# Patient Record
Sex: MALE | Race: ASIAN | NOT HISPANIC OR LATINO | URBAN - METROPOLITAN AREA
[De-identification: names, ages, dates, MRNs, and addresses within clinical notes are randomized per-mention and may not be internally consistent; named-entity substitution may affect disease eponyms.]

---

## 2022-11-13 ENCOUNTER — EMERGENCY (EMERGENCY)
Facility: HOSPITAL | Age: 47
LOS: 1 days | Discharge: HOME | End: 2022-11-13
Admitting: EMERGENCY MEDICINE

## 2022-11-13 VITALS
SYSTOLIC BLOOD PRESSURE: 154 MMHG | WEIGHT: 141.1 LBS | OXYGEN SATURATION: 99 % | RESPIRATION RATE: 15 BRPM | TEMPERATURE: 98 F | HEIGHT: 68 IN | HEART RATE: 78 BPM | DIASTOLIC BLOOD PRESSURE: 104 MMHG

## 2022-11-13 VITALS
RESPIRATION RATE: 16 BRPM | HEART RATE: 67 BPM | DIASTOLIC BLOOD PRESSURE: 78 MMHG | SYSTOLIC BLOOD PRESSURE: 128 MMHG | OXYGEN SATURATION: 98 %

## 2022-11-13 LAB
ALBUMIN SERPL ELPH-MCNC: 4 G/DL — SIGNIFICANT CHANGE UP (ref 3.4–5)
ALP SERPL-CCNC: 72 U/L — SIGNIFICANT CHANGE UP (ref 40–120)
ALT FLD-CCNC: 27 U/L — SIGNIFICANT CHANGE UP (ref 12–42)
ANION GAP SERPL CALC-SCNC: 4 MMOL/L — LOW (ref 9–16)
APPEARANCE UR: CLEAR — SIGNIFICANT CHANGE UP
AST SERPL-CCNC: 18 U/L — SIGNIFICANT CHANGE UP (ref 15–37)
BASOPHILS # BLD AUTO: 0.03 K/UL — SIGNIFICANT CHANGE UP (ref 0–0.2)
BASOPHILS NFR BLD AUTO: 0.5 % — SIGNIFICANT CHANGE UP (ref 0–2)
BILIRUB SERPL-MCNC: 0.4 MG/DL — SIGNIFICANT CHANGE UP (ref 0.2–1.2)
BILIRUB UR-MCNC: NEGATIVE — SIGNIFICANT CHANGE UP
BUN SERPL-MCNC: 21 MG/DL — SIGNIFICANT CHANGE UP (ref 7–23)
CALCIUM SERPL-MCNC: 9.5 MG/DL — SIGNIFICANT CHANGE UP (ref 8.5–10.5)
CHLORIDE SERPL-SCNC: 104 MMOL/L — SIGNIFICANT CHANGE UP (ref 96–108)
CO2 SERPL-SCNC: 32 MMOL/L — HIGH (ref 22–31)
COLOR SPEC: YELLOW — SIGNIFICANT CHANGE UP
CREAT SERPL-MCNC: 1.25 MG/DL — SIGNIFICANT CHANGE UP (ref 0.5–1.3)
DIFF PNL FLD: NEGATIVE — SIGNIFICANT CHANGE UP
EGFR: 71 ML/MIN/1.73M2 — SIGNIFICANT CHANGE UP
EOSINOPHIL # BLD AUTO: 0.01 K/UL — SIGNIFICANT CHANGE UP (ref 0–0.5)
EOSINOPHIL NFR BLD AUTO: 0.2 % — SIGNIFICANT CHANGE UP (ref 0–6)
GLUCOSE SERPL-MCNC: 155 MG/DL — HIGH (ref 70–99)
GLUCOSE UR QL: NEGATIVE — SIGNIFICANT CHANGE UP
HCT VFR BLD CALC: 44.5 % — SIGNIFICANT CHANGE UP (ref 39–50)
HGB BLD-MCNC: 14.9 G/DL — SIGNIFICANT CHANGE UP (ref 13–17)
IMM GRANULOCYTES NFR BLD AUTO: 0 % — SIGNIFICANT CHANGE UP (ref 0–0.9)
KETONES UR-MCNC: NEGATIVE — SIGNIFICANT CHANGE UP
LEUKOCYTE ESTERASE UR-ACNC: NEGATIVE — SIGNIFICANT CHANGE UP
LYMPHOCYTES # BLD AUTO: 0.96 K/UL — LOW (ref 1–3.3)
LYMPHOCYTES # BLD AUTO: 17.1 % — SIGNIFICANT CHANGE UP (ref 13–44)
MCHC RBC-ENTMCNC: 31.9 PG — SIGNIFICANT CHANGE UP (ref 27–34)
MCHC RBC-ENTMCNC: 33.5 GM/DL — SIGNIFICANT CHANGE UP (ref 32–36)
MCV RBC AUTO: 95.3 FL — SIGNIFICANT CHANGE UP (ref 80–100)
MONOCYTES # BLD AUTO: 0.32 K/UL — SIGNIFICANT CHANGE UP (ref 0–0.9)
MONOCYTES NFR BLD AUTO: 5.7 % — SIGNIFICANT CHANGE UP (ref 2–14)
NEUTROPHILS # BLD AUTO: 4.31 K/UL — SIGNIFICANT CHANGE UP (ref 1.8–7.4)
NEUTROPHILS NFR BLD AUTO: 76.5 % — SIGNIFICANT CHANGE UP (ref 43–77)
NITRITE UR-MCNC: NEGATIVE — SIGNIFICANT CHANGE UP
NRBC # BLD: 0 /100 WBCS — SIGNIFICANT CHANGE UP (ref 0–0)
PH UR: 7 — SIGNIFICANT CHANGE UP (ref 5–8)
PLATELET # BLD AUTO: 188 K/UL — SIGNIFICANT CHANGE UP (ref 150–400)
POTASSIUM SERPL-MCNC: 4.1 MMOL/L — SIGNIFICANT CHANGE UP (ref 3.5–5.3)
POTASSIUM SERPL-SCNC: 4.1 MMOL/L — SIGNIFICANT CHANGE UP (ref 3.5–5.3)
PROT SERPL-MCNC: 7.4 G/DL — SIGNIFICANT CHANGE UP (ref 6.4–8.2)
PROT UR-MCNC: NEGATIVE MG/DL — SIGNIFICANT CHANGE UP
RBC # BLD: 4.67 M/UL — SIGNIFICANT CHANGE UP (ref 4.2–5.8)
RBC # FLD: 12.1 % — SIGNIFICANT CHANGE UP (ref 10.3–14.5)
SODIUM SERPL-SCNC: 140 MMOL/L — SIGNIFICANT CHANGE UP (ref 132–145)
SP GR SPEC: 1.01 — SIGNIFICANT CHANGE UP (ref 1–1.03)
TROPONIN I, HIGH SENSITIVITY RESULT: 5.3 NG/L — SIGNIFICANT CHANGE UP
UROBILINOGEN FLD QL: 0.2 E.U./DL — SIGNIFICANT CHANGE UP
WBC # BLD: 5.63 K/UL — SIGNIFICANT CHANGE UP (ref 3.8–10.5)
WBC # FLD AUTO: 5.63 K/UL — SIGNIFICANT CHANGE UP (ref 3.8–10.5)

## 2022-11-13 PROCEDURE — 70450 CT HEAD/BRAIN W/O DYE: CPT | Mod: 26

## 2022-11-13 PROCEDURE — 99284 EMERGENCY DEPT VISIT MOD MDM: CPT

## 2022-11-13 NOTE — ED ADULT NURSE NOTE - OBJECTIVE STATEMENT
PT aox3 with steady gait. Pt states "vertigo"/ room spinning around him on Wednesday. Pt states on and off dizziness and lightheadedness since then. Pt states tingling in his left arm x 2 days. No weakness noted. Pt states back pain today with night sweats

## 2022-11-13 NOTE — ED ADULT NURSE NOTE - CHIEF COMPLAINT QUOTE
patient walk in c/o vertigo and left arm numbness and tingling since Wednesday after traveling back from [place] via train; also c/o lightheadedness; residual nerve damage on left side of face/neck from getting hit by softball in 2016

## 2022-11-13 NOTE — ED PROVIDER NOTE - NSFOLLOWUPINSTRUCTIONS_ED_ALL_ED_FT
Dizziness    Dizziness can manifest as a feeling of unsteadiness or light-headedness. You may feel like you are about to faint. This condition can be caused by a number of things, including medicines, dehydration, or illness. Drink enough fluid to keep your urine clear or pale yellow. Do not drink alcohol and limit your caffeine intake. Avoid quick or sudden movements.  Rise slowly from chairs and steady yourself until you feel okay. In the morning, first sit up on the side of the bed.    SEEK IMMEDIATE MEDICAL CARE IF YOU HAVE ANY OF THE FOLLOWING SYMPTOMS: vomiting, changes in your vision or speech, weakness in your arms or legs, trouble speaking or swallowing, chest pain, abdominal pain, shortness of breath, sweating, bleeding, headache, neck pain, or fever.    take dramamine no drowsy for  dizziness

## 2022-11-13 NOTE — ED PROVIDER NOTE - OBJECTIVE STATEMENT
46 y/o Male with PMHx of nasal polyp on the left side and facial trauma s/p getting hit in the face with a baseball in 2016 and sustaining a left orbital fracture, and asymmetrical pupils presenting with dizziness that he has been experiencing since tuesday. Patient states that he was in [place] on tuesday when he began to feel like the room was spinning with associated ringing in the ears and left upper arm numbness and tingling. Patient states that it was the most severe on tuesday and has since improved. Patient states that on occasion he notices that he gets a wave of dizziness. Patient endorses congestion for the last week. Denies fever/chills and N/V.

## 2022-11-13 NOTE — ED ADULT TRIAGE NOTE - CHIEF COMPLAINT QUOTE
patient walk in c/o vertigo and left arm numbness and tingling since Wednesday after traveling back from [place] via train; also c/o lightheadedness; residual nerve damage on left side of face/neck from getting hit by softball in 2016

## 2022-11-13 NOTE — ED PROVIDER NOTE - PATIENT PORTAL LINK FT
You can access the FollowMyHealth Patient Portal offered by [facility] by registering at the following website: [email or web address]. By joining [employer]’s FollowMyHealth portal, you will also be able to view your health information using other applications (apps) compatible with our system.

## 2022-11-13 NOTE — ED PROVIDER NOTE - CLINICAL SUMMARY MEDICAL DECISION MAKING FREE TEXT BOX
Patient with new onset of vertigo symptoms since Tuesday and episode of left upper arm numbness. Will do CT scan, check labs, and continue to monitor.

## 2022-11-13 NOTE — ED ADULT NURSE NOTE - BREATHING, MLM
What Type Of Note Output Would You Prefer (Optional)?: Bullet Format Hpi Title: Evaluation of Skin Lesions Spontaneous, unlabored and symmetrical Additional History: Several rough spots of the face and both arms x’s 2-3 months.

## 2022-11-15 DIAGNOSIS — R09.81 NASAL CONGESTION: ICD-10-CM

## 2022-11-15 DIAGNOSIS — R20.2 PARESTHESIA OF SKIN: ICD-10-CM

## 2022-11-15 DIAGNOSIS — R20.0 ANESTHESIA OF SKIN: ICD-10-CM

## 2022-11-15 DIAGNOSIS — R42 DIZZINESS AND GIDDINESS: ICD-10-CM

## 2022-11-15 DIAGNOSIS — Z88.1 ALLERGY STATUS TO OTHER ANTIBIOTIC AGENTS STATUS: ICD-10-CM

## 2022-12-20 ENCOUNTER — EMERGENCY (EMERGENCY)
Facility: HOSPITAL | Age: 47
LOS: 1 days | Discharge: HOME | End: 2022-12-20
Attending: EMERGENCY MEDICINE | Admitting: EMERGENCY MEDICINE

## 2022-12-20 VITALS
HEIGHT: 68 IN | SYSTOLIC BLOOD PRESSURE: 136 MMHG | RESPIRATION RATE: 16 BRPM | OXYGEN SATURATION: 99 % | DIASTOLIC BLOOD PRESSURE: 87 MMHG | TEMPERATURE: 98 F | WEIGHT: 139.99 LBS | HEART RATE: 74 BPM

## 2022-12-20 VITALS
TEMPERATURE: 98 F | OXYGEN SATURATION: 98 % | DIASTOLIC BLOOD PRESSURE: 84 MMHG | RESPIRATION RATE: 17 BRPM | SYSTOLIC BLOOD PRESSURE: 132 MMHG | HEART RATE: 76 BPM

## 2022-12-20 DIAGNOSIS — Z20.822 CONTACT WITH AND (SUSPECTED) EXPOSURE TO COVID-19: ICD-10-CM

## 2022-12-20 DIAGNOSIS — R10.9 UNSPECIFIED ABDOMINAL PAIN: ICD-10-CM

## 2022-12-20 DIAGNOSIS — Z88.1 ALLERGY STATUS TO OTHER ANTIBIOTIC AGENTS STATUS: ICD-10-CM

## 2022-12-20 DIAGNOSIS — Z87.09 PERSONAL HISTORY OF OTHER DISEASES OF THE RESPIRATORY SYSTEM: ICD-10-CM

## 2022-12-20 DIAGNOSIS — R06.02 SHORTNESS OF BREATH: ICD-10-CM

## 2022-12-20 DIAGNOSIS — R42 DIZZINESS AND GIDDINESS: ICD-10-CM

## 2022-12-20 DIAGNOSIS — R45.89 OTHER SYMPTOMS AND SIGNS INVOLVING EMOTIONAL STATE: ICD-10-CM

## 2022-12-20 DIAGNOSIS — R19.7 DIARRHEA, UNSPECIFIED: ICD-10-CM

## 2022-12-20 DIAGNOSIS — R07.89 OTHER CHEST PAIN: ICD-10-CM

## 2022-12-20 PROBLEM — Z87.828 PERSONAL HISTORY OF OTHER (HEALED) PHYSICAL INJURY AND TRAUMA: Chronic | Status: ACTIVE | Noted: 2022-11-13

## 2022-12-20 PROBLEM — J33.9 NASAL POLYP, UNSPECIFIED: Chronic | Status: ACTIVE | Noted: 2022-11-13

## 2022-12-20 LAB
ALBUMIN SERPL ELPH-MCNC: 3.8 G/DL — SIGNIFICANT CHANGE UP (ref 3.4–5)
ALP SERPL-CCNC: 70 U/L — SIGNIFICANT CHANGE UP (ref 40–120)
ALT FLD-CCNC: 31 U/L — SIGNIFICANT CHANGE UP (ref 12–42)
ANION GAP SERPL CALC-SCNC: 6 MMOL/L — LOW (ref 9–16)
AST SERPL-CCNC: 24 U/L — SIGNIFICANT CHANGE UP (ref 15–37)
BASOPHILS # BLD AUTO: 0.02 K/UL — SIGNIFICANT CHANGE UP (ref 0–0.2)
BASOPHILS NFR BLD AUTO: 0.4 % — SIGNIFICANT CHANGE UP (ref 0–2)
BILIRUB SERPL-MCNC: 0.7 MG/DL — SIGNIFICANT CHANGE UP (ref 0.2–1.2)
BUN SERPL-MCNC: 25 MG/DL — HIGH (ref 7–23)
CALCIUM SERPL-MCNC: 9 MG/DL — SIGNIFICANT CHANGE UP (ref 8.5–10.5)
CHLORIDE SERPL-SCNC: 102 MMOL/L — SIGNIFICANT CHANGE UP (ref 96–108)
CO2 SERPL-SCNC: 32 MMOL/L — HIGH (ref 22–31)
CREAT SERPL-MCNC: 1.26 MG/DL — SIGNIFICANT CHANGE UP (ref 0.5–1.3)
EGFR: 71 ML/MIN/1.73M2 — SIGNIFICANT CHANGE UP
EOSINOPHIL # BLD AUTO: 0.03 K/UL — SIGNIFICANT CHANGE UP (ref 0–0.5)
EOSINOPHIL NFR BLD AUTO: 0.6 % — SIGNIFICANT CHANGE UP (ref 0–6)
FLUAV H1 2009 PAND RNA SPEC QL NAA+PROBE: SIGNIFICANT CHANGE UP
FLUAV H1 RNA SPEC QL NAA+PROBE: SIGNIFICANT CHANGE UP
FLUAV H3 RNA SPEC QL NAA+PROBE: SIGNIFICANT CHANGE UP
FLUAV SUBTYP SPEC NAA+PROBE: SIGNIFICANT CHANGE UP
FLUBV RNA SPEC QL NAA+PROBE: SIGNIFICANT CHANGE UP
GLUCOSE SERPL-MCNC: 108 MG/DL — HIGH (ref 70–99)
HCT VFR BLD CALC: 43.9 % — SIGNIFICANT CHANGE UP (ref 39–50)
HGB BLD-MCNC: 15 G/DL — SIGNIFICANT CHANGE UP (ref 13–17)
IMM GRANULOCYTES NFR BLD AUTO: 0 % — SIGNIFICANT CHANGE UP (ref 0–0.9)
LYMPHOCYTES # BLD AUTO: 1.42 K/UL — SIGNIFICANT CHANGE UP (ref 1–3.3)
LYMPHOCYTES # BLD AUTO: 26.5 % — SIGNIFICANT CHANGE UP (ref 13–44)
MCHC RBC-ENTMCNC: 33.6 PG — SIGNIFICANT CHANGE UP (ref 27–34)
MCHC RBC-ENTMCNC: 34.2 GM/DL — SIGNIFICANT CHANGE UP (ref 32–36)
MCV RBC AUTO: 98.2 FL — SIGNIFICANT CHANGE UP (ref 80–100)
MONOCYTES # BLD AUTO: 0.4 K/UL — SIGNIFICANT CHANGE UP (ref 0–0.9)
MONOCYTES NFR BLD AUTO: 7.5 % — SIGNIFICANT CHANGE UP (ref 2–14)
NEUTROPHILS # BLD AUTO: 3.49 K/UL — SIGNIFICANT CHANGE UP (ref 1.8–7.4)
NEUTROPHILS NFR BLD AUTO: 65 % — SIGNIFICANT CHANGE UP (ref 43–77)
NRBC # BLD: 0 /100 WBCS — SIGNIFICANT CHANGE UP (ref 0–0)
PLATELET # BLD AUTO: 175 K/UL — SIGNIFICANT CHANGE UP (ref 150–400)
POTASSIUM SERPL-MCNC: 4.2 MMOL/L — SIGNIFICANT CHANGE UP (ref 3.5–5.3)
POTASSIUM SERPL-SCNC: 4.2 MMOL/L — SIGNIFICANT CHANGE UP (ref 3.5–5.3)
PROT SERPL-MCNC: 7.2 G/DL — SIGNIFICANT CHANGE UP (ref 6.4–8.2)
RAPID RVP RESULT: SIGNIFICANT CHANGE UP
RBC # BLD: 4.47 M/UL — SIGNIFICANT CHANGE UP (ref 4.2–5.8)
RBC # FLD: 11.6 % — SIGNIFICANT CHANGE UP (ref 10.3–14.5)
RSV RNA SPEC QL NAA+PROBE: SIGNIFICANT CHANGE UP
SARS-COV-2 RNA SPEC QL NAA+PROBE: SIGNIFICANT CHANGE UP
SODIUM SERPL-SCNC: 140 MMOL/L — SIGNIFICANT CHANGE UP (ref 132–145)
TROPONIN I, HIGH SENSITIVITY RESULT: 5.6 NG/L — SIGNIFICANT CHANGE UP
WBC # BLD: 5.36 K/UL — SIGNIFICANT CHANGE UP (ref 3.8–10.5)
WBC # FLD AUTO: 5.36 K/UL — SIGNIFICANT CHANGE UP (ref 3.8–10.5)

## 2022-12-20 PROCEDURE — 74177 CT ABD & PELVIS W/CONTRAST: CPT | Mod: 26

## 2022-12-20 PROCEDURE — 93010 ELECTROCARDIOGRAM REPORT: CPT

## 2022-12-20 PROCEDURE — 71045 X-RAY EXAM CHEST 1 VIEW: CPT | Mod: 26

## 2022-12-20 PROCEDURE — 99284 EMERGENCY DEPT VISIT MOD MDM: CPT

## 2022-12-20 PROCEDURE — 71260 CT THORAX DX C+: CPT | Mod: 26

## 2022-12-20 RX ORDER — ASPIRIN/CALCIUM CARB/MAGNESIUM 324 MG
81 TABLET ORAL ONCE
Refills: 0 | Status: COMPLETED | OUTPATIENT
Start: 2022-12-20 | End: 2022-12-20

## 2022-12-20 RX ORDER — KETOROLAC TROMETHAMINE 30 MG/ML
15 SYRINGE (ML) INJECTION ONCE
Refills: 0 | Status: DISCONTINUED | OUTPATIENT
Start: 2022-12-20 | End: 2022-12-20

## 2022-12-20 RX ORDER — DIATRIZOATE MEGLUMINE 180 MG/ML
30 INJECTION, SOLUTION INTRAVESICAL ONCE
Refills: 0 | Status: COMPLETED | OUTPATIENT
Start: 2022-12-20 | End: 2022-12-20

## 2022-12-20 RX ADMIN — Medication 15 MILLIGRAM(S): at 13:50

## 2022-12-20 RX ADMIN — DIATRIZOATE MEGLUMINE 30 MILLILITER(S): 180 INJECTION, SOLUTION INTRAVESICAL at 13:52

## 2022-12-20 RX ADMIN — Medication 81 MILLIGRAM(S): at 18:42

## 2022-12-20 NOTE — ED PROVIDER NOTE - CLINICAL SUMMARY MEDICAL DECISION MAKING FREE TEXT BOX
Patient with no known medical history with 1 month of symptoms as noted in HPI.  Although the patient is anxious, it is not clear if this is the etiology of the symptoms or if the symptoms are precipitating the anxiety.  Accordingly, will check labs, ekg, ct and then reassess.

## 2022-12-20 NOTE — ED ADULT NURSE NOTE - OBJECTIVE STATEMENT
Pt walked in c/o of left sided rib pain radiating to the left upper back "and sometimes across my chest" x 1 month. Reports a bout of dizziness x this morning that has now gone away. Pt also reports nausea after eating x 1 month. Denies pmh, fever, chills, sob, v/d. Resting comfortably in chair. NAD. Reports being scheduled for a CT on friday

## 2022-12-20 NOTE — ED PROVIDER NOTE - NSFOLLOWUPINSTRUCTIONS_ED_ALL_ED_FT
YOUR CAT SCAN HAS FINDINGS THAT NEED FOLLOW UP TESTING.    THERE IS A LESION IN THE LUNG THAT IS LIKELY RELATED TO A PRIOR INFECTION.  YOU MAY NEED ANOTHER CAT SCAN IN A FEW MONTHS TO CHECK TO SEE IF IT CHANGES.  A PRIMARY CARE DOCTOR CAN DECIDE THIS WITH YOU.  YOU HAVE BEEN PUT INTO OUR REFERRAL SYSTEM FOR A PRIMARY CARE DOCTOR AND DR. ARGUETA'S NAME IS ALSO BELOW AS SOMEONE WHO MAY BE ABLE TO SEE YOU.    MORE IMPORTANT IS THAT YOU HAVE CALCIUM DEPOSITS IN THE ARTERIES AROUND YOUR HEART.  THIS CAN PUT YOU AT RISK FOR HEART ATTACK.  YOU NEED TO SEE A CARDIOLOGIST THIS WEEK OR EARLY NEXT WEEK FOR FURTHER EVALUATION.  YOU MAY NEED A STRESS TEST OR A CATHETERIZATION AND OTHER TESTING.    UNTIL YOU SEE THE CARDIOLOGIST, TAKE A BABY ASPIRIN DAILY.    A CARDIOLOGIST'S NAME APPEARS BELOW AND WE WILL ALSO PUT YOU IN OUR REFERRAL SYSTEM FOR A TIMELY APPOINTMENT.    RETURN TO THE EMERGENCY ROOM IMMEDIATELY FOR:  TROUBLE BREATHING  WORSENING OR MORE FREQUENT CHEST PAIN  CHEST PAIN THAT IS ASSOCIATED WITH SWEATING, TROUBLE BREATHING, NAUSEA OR VOMITING OR WEAKNESS  PASSING OUT  HEART RACING  ANY NEW, WORSENING OR CONCERNING SYMPTOMS

## 2022-12-20 NOTE — ED PROVIDER NOTE - PHYSICAL EXAMINATION
General:  Well appearing, no distress.  Mildly anxious   HEENT:  No conjunctival injection, no ocular discharge  Chest:  Non-tender, no crepitance  Lungs:  Clear to auscultation bilaterally   Heart:  s1s2 normal, no murmur  Abdomen:  soft, non-tender, non-distended  :  Deferred  Rectal:  Deferred  Extremities: No edema, normal perfusion, no joint swelling or tenderness. Hands sweaty   Neuro:  Alert and oriented x 3, cn2-12 intact, full strength, sensory grossly intact, normal gait  Psychiatry:  Anxious, cooperative, no expression of suicidal or homicidal ideation

## 2022-12-20 NOTE — ED ADULT TRIAGE NOTE - CHIEF COMPLAINT QUOTE
Pt walked into ER c/o dizziness and left sided discomfort. Pt states the pain is on the left lateral side of his body and down his left arm. Pt states he was seen here for same in november with negative findings. Pt was also seen at urgent care and has outpatient CT scheduled for friday but does not think he should wait. Pt also endorses nausea after eating. Pt denies PMH. BEFAST negative in triage. Pt not vaccinated for covid.

## 2022-12-20 NOTE — ED ADULT NURSE NOTE - CAS DISCH TRANSFER METHOD
Patient states his Rx for Zostrix cream needs prior authorization
Spoke with rep, form was filled out/signed by Dr. Gabriella Bass office.  Awaiting approval
Via fax medication was denied due to pt having tried and failed one preferred drug such as  OTC capsaicin 0.1% cream and OTC Zostrix 0.1% Hp cream. Pt and Dr. Dl Miller made aware
Walking

## 2022-12-20 NOTE — ED PROVIDER NOTE - OBJECTIVE STATEMENT
Patient has not been feeling well since his visit to this ER in November  At that time, he awakened with a brief episode of spinning dizziness.  He had labs and a head CT done and was told everything was ok  Since then, he has been having left sided abdominal pain radiating into the chest. When he raises his arms, he feels as if the left side of his abdomen protrudes more than the right and is wondering if this is related to a prior injury to the oblique muscle.  At times, he has chest pain across the chest when he is sitting at his computer.  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  Also concerned he could have a version of covid affecting the stomach or that he is having anxiety.  He went to  the other day and they arranged an appointment for a CT of the abdomen, but it is not until Friday.  Patient does not feel he should wait until Friday.  No fever, no vomiting, no sob/pulliam.

## 2022-12-20 NOTE — ED PROVIDER NOTE - PATIENT PORTAL LINK FT
You can access the FollowMyHealth Patient Portal offered by [facility] by registering at the following website: [email or web address]. By joining [employer]’s FollowMyHealth portal, you will also be able to view your health information using other applications (apps) compatible with our system.

## 2022-12-20 NOTE — ED PROVIDER NOTE - CARE PROVIDERS DIRECT ADDRESSES
[email or web address]

## 2022-12-20 NOTE — ED PROVIDER NOTE - NS ED ROS FT
Constitutional:  No fever, no weakness, no dizziness, no wt loss  HEENT:  No change in vision, no discharge, no congestion  Cardiac:  Intermittent chest pain but none at this time   Pulm:  No cough, no sob  GI:  Left sided abd pain intermittently.  Occasional soft stool  : No hematuria  Neuro:  No ha, no neck pain, no numbness, no weakness, no change in speech, vision or gait  MSK:  No joint pain or swelling

## 2022-12-20 NOTE — ED PROVIDER NOTE - PROGRESS NOTE DETAILS
discuss results with patient.  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  I ask for more detail about the episodes of chest tightness and he reports they are mostly when he is at his computer trying to concentrate. Some are associated with SOB but no n/v and no specific exertional component, although he has had them with walking.  Advise that we will get his EKG and then I will speak with Cardiology regarding his report.  Tech doing EKG now. discuss results with patient.  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  I ask for more detail about the episodes of chest tightness and he reports they are mostly when he is at his computer trying to concentrate. Some are associated with SOB but no n/v and no specific exertional component, although he has had them with walking.  Advise that we will get his EKG and then I will speak with Cardiology regarding his report.  Tech doing EKG now.  Cardiology consult requested through transfer center.  They are waiting for a call back. Dr. Siegel calls through transfer center. Case and results discussed in full detail.  She advises outpatient follow up and will contact Dr. Evans to have the patient seen this week. Dr. Siegel calls through transfer center. Case and results discussed in full detail.  She advises outpatient follow up and will contact Dr. Evans to have the patient seen this week.  Discussed plan in great detail with patient and answered all questions, described return precautions and patient verbalized understanding of discussion and plan.

## 2022-12-20 NOTE — ED PROVIDER NOTE - CARE PROVIDER_API CALL
Paolo Mcfarlane)  Cardiovascular Disease  [address], [address]rd [place] [address]  Phone: [phone]  Fax: [phone]  Follow Up Time:     Joel Saavedra (DO)  [address], [facility] [place] [address]  Phone: [phone]  Fax: [phone]  Follow Up Time:

## 2022-12-21 ENCOUNTER — EMERGENCY (EMERGENCY)
Facility: HOSPITAL | Age: 47
LOS: 1 days | Discharge: HOME | End: 2022-12-21
Admitting: EMERGENCY MEDICINE

## 2022-12-21 VITALS
TEMPERATURE: 98 F | HEART RATE: 82 BPM | HEIGHT: 68 IN | OXYGEN SATURATION: 99 % | WEIGHT: 139.99 LBS | RESPIRATION RATE: 18 BRPM | DIASTOLIC BLOOD PRESSURE: 97 MMHG | SYSTOLIC BLOOD PRESSURE: 148 MMHG

## 2022-12-21 VITALS
DIASTOLIC BLOOD PRESSURE: 78 MMHG | SYSTOLIC BLOOD PRESSURE: 132 MMHG | OXYGEN SATURATION: 99 % | HEART RATE: 68 BPM | TEMPERATURE: 98 F | RESPIRATION RATE: 16 BRPM

## 2022-12-21 DIAGNOSIS — Z88.1 ALLERGY STATUS TO OTHER ANTIBIOTIC AGENTS STATUS: ICD-10-CM

## 2022-12-21 DIAGNOSIS — R07.89 OTHER CHEST PAIN: ICD-10-CM

## 2022-12-21 DIAGNOSIS — Z87.09 PERSONAL HISTORY OF OTHER DISEASES OF THE RESPIRATORY SYSTEM: ICD-10-CM

## 2022-12-21 DIAGNOSIS — Z20.822 CONTACT WITH AND (SUSPECTED) EXPOSURE TO COVID-19: ICD-10-CM

## 2022-12-21 LAB
ALBUMIN SERPL ELPH-MCNC: 4.1 G/DL — SIGNIFICANT CHANGE UP (ref 3.4–5)
ALP SERPL-CCNC: 75 U/L — SIGNIFICANT CHANGE UP (ref 40–120)
ALT FLD-CCNC: 31 U/L — SIGNIFICANT CHANGE UP (ref 12–42)
ANION GAP SERPL CALC-SCNC: 9 MMOL/L — SIGNIFICANT CHANGE UP (ref 9–16)
AST SERPL-CCNC: 22 U/L — SIGNIFICANT CHANGE UP (ref 15–37)
BASOPHILS # BLD AUTO: 0.03 K/UL — SIGNIFICANT CHANGE UP (ref 0–0.2)
BASOPHILS NFR BLD AUTO: 0.4 % — SIGNIFICANT CHANGE UP (ref 0–2)
BILIRUB SERPL-MCNC: 0.5 MG/DL — SIGNIFICANT CHANGE UP (ref 0.2–1.2)
BUN SERPL-MCNC: 23 MG/DL — SIGNIFICANT CHANGE UP (ref 7–23)
CALCIUM SERPL-MCNC: 9.7 MG/DL — SIGNIFICANT CHANGE UP (ref 8.5–10.5)
CHLORIDE SERPL-SCNC: 100 MMOL/L — SIGNIFICANT CHANGE UP (ref 96–108)
CO2 SERPL-SCNC: 30 MMOL/L — SIGNIFICANT CHANGE UP (ref 22–31)
CREAT SERPL-MCNC: 1.41 MG/DL — HIGH (ref 0.5–1.3)
EGFR: 62 ML/MIN/1.73M2 — SIGNIFICANT CHANGE UP
EOSINOPHIL # BLD AUTO: 0.04 K/UL — SIGNIFICANT CHANGE UP (ref 0–0.5)
EOSINOPHIL NFR BLD AUTO: 0.5 % — SIGNIFICANT CHANGE UP (ref 0–6)
GLUCOSE SERPL-MCNC: 100 MG/DL — HIGH (ref 70–99)
HCT VFR BLD CALC: 45.3 % — SIGNIFICANT CHANGE UP (ref 39–50)
HGB BLD-MCNC: 15.1 G/DL — SIGNIFICANT CHANGE UP (ref 13–17)
IMM GRANULOCYTES NFR BLD AUTO: 0.3 % — SIGNIFICANT CHANGE UP (ref 0–0.9)
LIDOCAIN IGE QN: 121 U/L — SIGNIFICANT CHANGE UP (ref 73–393)
LYMPHOCYTES # BLD AUTO: 2.17 K/UL — SIGNIFICANT CHANGE UP (ref 1–3.3)
LYMPHOCYTES # BLD AUTO: 29.4 % — SIGNIFICANT CHANGE UP (ref 13–44)
MAGNESIUM SERPL-MCNC: 2.3 MG/DL — SIGNIFICANT CHANGE UP (ref 1.6–2.6)
MCHC RBC-ENTMCNC: 32.3 PG — SIGNIFICANT CHANGE UP (ref 27–34)
MCHC RBC-ENTMCNC: 33.3 GM/DL — SIGNIFICANT CHANGE UP (ref 32–36)
MCV RBC AUTO: 97 FL — SIGNIFICANT CHANGE UP (ref 80–100)
MONOCYTES # BLD AUTO: 0.55 K/UL — SIGNIFICANT CHANGE UP (ref 0–0.9)
MONOCYTES NFR BLD AUTO: 7.5 % — SIGNIFICANT CHANGE UP (ref 2–14)
NEUTROPHILS # BLD AUTO: 4.56 K/UL — SIGNIFICANT CHANGE UP (ref 1.8–7.4)
NEUTROPHILS NFR BLD AUTO: 61.9 % — SIGNIFICANT CHANGE UP (ref 43–77)
NRBC # BLD: 0 /100 WBCS — SIGNIFICANT CHANGE UP (ref 0–0)
NT-PROBNP SERPL-SCNC: 12 PG/ML — SIGNIFICANT CHANGE UP
PLATELET # BLD AUTO: 184 K/UL — SIGNIFICANT CHANGE UP (ref 150–400)
POTASSIUM SERPL-MCNC: 4.5 MMOL/L — SIGNIFICANT CHANGE UP (ref 3.5–5.3)
POTASSIUM SERPL-SCNC: 4.5 MMOL/L — SIGNIFICANT CHANGE UP (ref 3.5–5.3)
PROT SERPL-MCNC: 7.7 G/DL — SIGNIFICANT CHANGE UP (ref 6.4–8.2)
RBC # BLD: 4.67 M/UL — SIGNIFICANT CHANGE UP (ref 4.2–5.8)
RBC # FLD: 11.8 % — SIGNIFICANT CHANGE UP (ref 10.3–14.5)
SODIUM SERPL-SCNC: 139 MMOL/L — SIGNIFICANT CHANGE UP (ref 132–145)
TROPONIN I, HIGH SENSITIVITY RESULT: 4.8 NG/L — SIGNIFICANT CHANGE UP
WBC # BLD: 7.37 K/UL — SIGNIFICANT CHANGE UP (ref 3.8–10.5)
WBC # FLD AUTO: 7.37 K/UL — SIGNIFICANT CHANGE UP (ref 3.8–10.5)

## 2022-12-21 PROCEDURE — 93010 ELECTROCARDIOGRAM REPORT: CPT

## 2022-12-21 PROCEDURE — 99284 EMERGENCY DEPT VISIT MOD MDM: CPT

## 2022-12-21 RX ORDER — ASPIRIN/CALCIUM CARB/MAGNESIUM 324 MG
81 TABLET ORAL ONCE
Refills: 0 | Status: COMPLETED | OUTPATIENT
Start: 2022-12-21 | End: 2022-12-21

## 2022-12-21 RX ADMIN — Medication 81 MILLIGRAM(S): at 02:11

## 2022-12-21 NOTE — ED PROVIDER NOTE - PHYSICAL EXAMINATION
VITAL SIGNS: I have reviewed nursing notes and confirm.  CONSTITUTIONAL: Well-developed; well-nourished; in no acute distress.  SKIN: Skin is warm and dry, no acute rash.  HEAD: Normocephalic; atraumatic.  NECK: Supple; non tender.  CARD: S1, S2 normal; no murmurs, gallops, or rubs. Regular rate and rhythm.  RESP: No wheezes, rales or rhonchi.  ABD: Soft; non-distended; non-tender; no rebound or guarding.  EXT: Normal ROM. No clubbing, cyanosis or edema.  NEURO: Alert, oriented. Grossly unremarkable. VIEYRA, normal tone, no gross motor or sensory changes. Fluent speech.   PSYCH: Cooperative, appropriate. Mood and affect wnl.

## 2022-12-21 NOTE — ED PROVIDER NOTE - OBJECTIVE STATEMENT
47-year-old male, presenting to the ED today complaining of left-sided chest pain for the past few hours.  Patient was seen in our ED earlier today for similar complaint.  He was found during that work-up to have calcification along the coronary artery, however there were no acute findings for any ischemic events and patient was recommended to follow-up with cardiology.  Patient return to the ED after having acute worsening of his pain, for which he was told to return to the ED if it occurs.  After patient was discharged from the hospital earlier, he reports going home working out on his exercise bike, then sitting down to play video games online with friends.  He states while playing his game he noted a sharp pain along the left side of the chest.  He states this pain was more severe than the pain he felt earlier in the day which prompted him to come to the ER back to the ER.  Patient has had been having intermittent chest pain for the past month.  Symptoms began after of vertigo episode back in November.  Patient denies exertion with pain, pain radiation, or pain with deep breathing.  He has not taken any medications for pain relief.  He further denies prior cardiac work-up including echo or stress test.  No family history of heart disease as per patient.  Patient is not a smoker.  Pain is currently improved prior to when he first felt the symptoms.  He also endorses feeling occasional mild shortness of breath with the pain, denies any shortness of breath at time of exam.

## 2022-12-21 NOTE — ED PROVIDER NOTE - CARE PROVIDER_API CALL
Paolo Mcfarlane)  Cardiovascular Disease  [address] [facility] [facility], [address]rd [place] [address]  Phone: [phone]  Fax: [phone]  Follow Up Time: Urgent

## 2022-12-21 NOTE — ED ADULT NURSE NOTE - OBJECTIVE STATEMENT
Patient is a 48 y/o M c/o chest pain. pt reports being seen today at [facility] for chest pain. pt reports when he went home, chest pain returned while he was on the computer. pt reports feeling hands go cold and pain radiates "a little bit to the left shoulder". pt reports dizziness and SOB at rest. pt reports pain is improving but wanted to "make sure everything was ok". Pt also c/o of nausea.

## 2022-12-21 NOTE — ED PROVIDER NOTE - NS ED ROS FT
+chest pain  Denies fevers, chills, nausea, vomiting, diarrhea, constipation, abdominal pain, urinary symptoms, palpitations, dyspnea on exertion, syncope/near syncope, cough/URI symptoms, headache, weakness, numbness, focal deficits, visual changes, gait or balance changes, dizziness

## 2022-12-21 NOTE — ED PROVIDER NOTE - NSFOLLOWUPINSTRUCTIONS_ED_ALL_ED_FT
Please follow-up with cardiology in the a.m.  The number for Dr. Mcfarlane's office is listed on the next page.  Return to the ER for any worsening symptoms.      Chest Pain    Chest pain can be caused by many different conditions which may or may not be dangerous. Causes include heartburn, lung infections, heart attack, blood clot in lungs, skin infections, strain or damage to muscle, cartilage, or bones, etc. In addition to a history and physical examination, an electrocardiogram (ECG) or other lab tests may have been performed to determine the cause of your chest pain. Follow up with your primary care provider or with a cardiologist as instructed.     SEEK IMMEDIATE MEDICAL CARE IF YOU HAVE ANY OF THE FOLLOWING SYMPTOMS: worsening chest pain, coughing up blood, unexplained back/neck/jaw pain, severe abdominal pain, dizziness or lightheadedness, fainting, shortness of breath, sweaty or clammy skin, vomiting, or racing heart beat. These symptoms may represent a serious problem that is an emergency. Do not wait to see if the symptoms will go away. Get medical help right away. Call 911 and do not drive yourself to the hospital.

## 2022-12-21 NOTE — ED ADULT TRIAGE NOTE - CHIEF COMPLAINT QUOTE
walk into ED states he was seen earlier and told to return if he had a worsening of symptoms. states he was sitting at his computer and had sudden onset of chest pain and felt his hands get cold. states pain is still there but improving.

## 2022-12-21 NOTE — ED PROVIDER NOTE - CLINICAL SUMMARY MEDICAL DECISION MAKING FREE TEXT BOX
47-year-old male, presenting to the ED today complaining of left-sided chest pain for the past few hours. Patient states the pain has been on and off for the past month however he noted it to be increased while playing his video game today.  Patient now states his symptoms have improved and is not as severe as when it worsened earlier this evening.  No other red flags noted on exam with stable vitals in triage.  EKG shows sinus rhythm without ischemic changes.  Will plan to obtain medical labs and give patient p.o. aspirin.  Dispo pending medical work-up.

## 2022-12-21 NOTE — ED PROVIDER NOTE - PROGRESS NOTE DETAILS
Patient reports feeling improved symptoms and chest pain has resolved.  Cardiac enzymes and EKG within normal limits.  Patient comfortable with discharge and will plan to follow-up with cardiology in the morning. Return precautions discussed and patient leaves in no acute distress.

## 2022-12-21 NOTE — ED PROVIDER NOTE - PATIENT PORTAL LINK FT
You can access the FollowMyHealth Patient Portal offered by [facility] by registering at the following website: [email or web address]. By joining [employer]’s FollowMyHealth portal, you will also be able to view your health information using other applications (apps) compatible with our system.

## 2022-12-25 ENCOUNTER — EMERGENCY (EMERGENCY)
Facility: HOSPITAL | Age: 47
LOS: 1 days | Discharge: HOME | End: 2022-12-25
Attending: EMERGENCY MEDICINE | Admitting: EMERGENCY MEDICINE

## 2022-12-25 VITALS
HEART RATE: 87 BPM | OXYGEN SATURATION: 100 % | TEMPERATURE: 98 F | SYSTOLIC BLOOD PRESSURE: 136 MMHG | DIASTOLIC BLOOD PRESSURE: 90 MMHG | HEIGHT: 68 IN | RESPIRATION RATE: 100 BRPM | WEIGHT: 139.99 LBS

## 2022-12-25 PROCEDURE — 99284 EMERGENCY DEPT VISIT MOD MDM: CPT

## 2022-12-25 NOTE — ED ADULT NURSE NOTE - OBJECTIVE STATEMENT
Patient is a 47yoM presenting to the Ed for generalized abd cramping with diarrhea. Pt is axox3, spont unlabored breathing. Per triage note, patient c/o chest pain, on assessment, patient is just endorsing abd cramping with diarrhea. Pt has already been seen by GI and had GI testing done. Denies new complaints.

## 2022-12-25 NOTE — ED PROVIDER NOTE - PATIENT PORTAL LINK FT
You can access the FollowMyHealth Patient Portal offered by [facility] by registering at the following website: [email or web address]. By joining [employer]s FollowMyHealth portal, you will also be able to view your health information using other applications (apps) compatible with our system.

## 2022-12-25 NOTE — ED ADULT NURSE NOTE - NSFALLRSKASSESSDT_ED_ALL_ED
What Type Of Note Output Would You Prefer (Optional)?: Standard Output
Hpi Title: Evaluation of Skin Lesions
How Severe Are Your Spot(S)?: mild
Have Your Spot(S) Been Treated In The Past?: has not been treated
25-Dec-2022 22:48

## 2022-12-25 NOTE — ED PROVIDER NOTE - CLINICAL SUMMARY MEDICAL DECISION MAKING FREE TEXT BOX
Pt with diarrhea episodes and abd cramping, already being treated by GI. Pending ova and parasite testing that was already sent by GI doctor to lab. Explained to pt that testing could be repeated but would take several days to be back. Pt will follow up w GI for results. abd exam non tender. pt is well appearing and in no distress.

## 2022-12-25 NOTE — ED ADULT TRIAGE NOTE - CHIEF COMPLAINT QUOTE
male patient walk-in here for eval of chest pain., SOB and diarrhea x 1.5 months. patient states he was seen here earlier for similar symptoms and was told to follow up with a cardiologist. patient states cardiologist work up was negative and was being tested for stool Ova & parasite pending results. patient states at home when he is sitting down he has increased respiratory effort which has been getting worse as well as more consistent diarrhea prompting his visit tonight.

## 2022-12-25 NOTE — ED PROVIDER NOTE - OBJECTIVE STATEMENT
48 yo male pt, presenst for testing for intestinal parasites. Of note triage note states pt had complain of chest pain, but upon evaluation pt refused his EKG as his compain "does not have to do with his heart". He was evaluated recently for chest pain in the ED and had f/u w cardio that had a normal echo and carotids. At this time is not having chest pain. Since then has had increasing episodes of abd cramping and diarrhea episodes (watery). Already had an appt with GI last week and testing was sent out for parasites. Pt is waiting on results. Pt came to ED with expectation to get treatment for a possible intestinal parasite infection. Pt is eating and drinking well. denies any fever, chills, bloody diarrhea.

## 2022-12-27 DIAGNOSIS — Z88.1 ALLERGY STATUS TO OTHER ANTIBIOTIC AGENTS STATUS: ICD-10-CM

## 2022-12-27 DIAGNOSIS — R10.9 UNSPECIFIED ABDOMINAL PAIN: ICD-10-CM

## 2022-12-27 DIAGNOSIS — R19.7 DIARRHEA, UNSPECIFIED: ICD-10-CM
